# Patient Record
Sex: MALE | Race: WHITE | NOT HISPANIC OR LATINO | URBAN - METROPOLITAN AREA
[De-identification: names, ages, dates, MRNs, and addresses within clinical notes are randomized per-mention and may not be internally consistent; named-entity substitution may affect disease eponyms.]

---

## 2017-05-20 ENCOUNTER — HOSPITAL ENCOUNTER (OUTPATIENT)
Facility: MEDICAL CENTER | Age: 42
End: 2017-05-21
Attending: EMERGENCY MEDICINE | Admitting: INTERNAL MEDICINE

## 2017-05-20 DIAGNOSIS — T50.901A OVERDOSE, ACCIDENTAL OR UNINTENTIONAL, INITIAL ENCOUNTER: ICD-10-CM

## 2017-05-20 LAB
ALBUMIN SERPL BCP-MCNC: 4.3 G/DL (ref 3.2–4.9)
ALBUMIN/GLOB SERPL: 1.3 G/DL
ALP SERPL-CCNC: 114 U/L (ref 30–99)
ALT SERPL-CCNC: 16 U/L (ref 2–50)
AMPHET UR QL SCN: POSITIVE
ANION GAP SERPL CALC-SCNC: 9 MMOL/L (ref 0–11.9)
AST SERPL-CCNC: 25 U/L (ref 12–45)
BARBITURATES UR QL SCN: NEGATIVE
BASOPHILS # BLD AUTO: 0.4 % (ref 0–1.8)
BASOPHILS # BLD: 0.03 K/UL (ref 0–0.12)
BENZODIAZ UR QL SCN: POSITIVE
BILIRUB SERPL-MCNC: 0.5 MG/DL (ref 0.1–1.5)
BUN SERPL-MCNC: 14 MG/DL (ref 8–22)
BZE UR QL SCN: NEGATIVE
CALCIUM SERPL-MCNC: 9.3 MG/DL (ref 8.5–10.5)
CANNABINOIDS UR QL SCN: POSITIVE
CHLORIDE SERPL-SCNC: 100 MMOL/L (ref 96–112)
CO2 SERPL-SCNC: 28 MMOL/L (ref 20–33)
CREAT SERPL-MCNC: 0.96 MG/DL (ref 0.5–1.4)
EOSINOPHIL # BLD AUTO: 0.21 K/UL (ref 0–0.51)
EOSINOPHIL NFR BLD: 2.8 % (ref 0–6.9)
ERYTHROCYTE [DISTWIDTH] IN BLOOD BY AUTOMATED COUNT: 40.1 FL (ref 35.9–50)
GFR SERPL CREATININE-BSD FRML MDRD: >60 ML/MIN/1.73 M 2
GLOBULIN SER CALC-MCNC: 3.4 G/DL (ref 1.9–3.5)
GLUCOSE SERPL-MCNC: 106 MG/DL (ref 65–99)
HCT VFR BLD AUTO: 49.2 % (ref 42–52)
HGB BLD-MCNC: 17 G/DL (ref 14–18)
IMM GRANULOCYTES # BLD AUTO: 0.01 K/UL (ref 0–0.11)
IMM GRANULOCYTES NFR BLD AUTO: 0.1 % (ref 0–0.9)
LYMPHOCYTES # BLD AUTO: 2.1 K/UL (ref 1–4.8)
LYMPHOCYTES NFR BLD: 27.8 % (ref 22–41)
MCH RBC QN AUTO: 31.5 PG (ref 27–33)
MCHC RBC AUTO-ENTMCNC: 34.6 G/DL (ref 33.7–35.3)
MCV RBC AUTO: 91.3 FL (ref 81.4–97.8)
MDMA UR QL SCN: NEGATIVE
METHADONE UR QL SCN: NEGATIVE
MONOCYTES # BLD AUTO: 0.51 K/UL (ref 0–0.85)
MONOCYTES NFR BLD AUTO: 6.7 % (ref 0–13.4)
NEUTROPHILS # BLD AUTO: 4.7 K/UL (ref 1.82–7.42)
NEUTROPHILS NFR BLD: 62.2 % (ref 44–72)
NRBC # BLD AUTO: 0 K/UL
NRBC BLD AUTO-RTO: 0 /100 WBC
OPIATES UR QL SCN: POSITIVE
OXYCODONE UR QL SCN: NEGATIVE
PCP UR QL SCN: NEGATIVE
PLATELET # BLD AUTO: 245 K/UL (ref 164–446)
PMV BLD AUTO: 9.5 FL (ref 9–12.9)
POTASSIUM SERPL-SCNC: 4.2 MMOL/L (ref 3.6–5.5)
PROPOXYPH UR QL SCN: NEGATIVE
PROT SERPL-MCNC: 7.7 G/DL (ref 6–8.2)
RBC # BLD AUTO: 5.39 M/UL (ref 4.7–6.1)
SODIUM SERPL-SCNC: 137 MMOL/L (ref 135–145)
WBC # BLD AUTO: 7.6 K/UL (ref 4.8–10.8)

## 2017-05-20 PROCEDURE — 80053 COMPREHEN METABOLIC PANEL: CPT

## 2017-05-20 PROCEDURE — 85025 COMPLETE CBC W/AUTO DIFF WBC: CPT

## 2017-05-20 PROCEDURE — 94760 N-INVAS EAR/PLS OXIMETRY 1: CPT

## 2017-05-20 PROCEDURE — 700105 HCHG RX REV CODE 258: Performed by: EMERGENCY MEDICINE

## 2017-05-20 PROCEDURE — 96360 HYDRATION IV INFUSION INIT: CPT

## 2017-05-20 PROCEDURE — 99285 EMERGENCY DEPT VISIT HI MDM: CPT

## 2017-05-20 PROCEDURE — 96361 HYDRATE IV INFUSION ADD-ON: CPT

## 2017-05-20 PROCEDURE — 80307 DRUG TEST PRSMV CHEM ANLYZR: CPT

## 2017-05-20 PROCEDURE — 99220 PR INITIAL OBSERVATION CARE,LEVL III: CPT | Performed by: INTERNAL MEDICINE

## 2017-05-20 RX ORDER — SODIUM CHLORIDE 9 MG/ML
1000 INJECTION, SOLUTION INTRAVENOUS ONCE
Status: COMPLETED | OUTPATIENT
Start: 2017-05-20 | End: 2017-05-21

## 2017-05-20 RX ADMIN — SODIUM CHLORIDE 1000 ML: 9 INJECTION, SOLUTION INTRAVENOUS at 22:50

## 2017-05-20 NOTE — IP AVS SNAPSHOT
" <p align=\"LEFT\"><IMG SRC=\"//[email or web address]$/[email or web address]\" alt=\"Image\" WIDTH=\"50%\" HEIGHT=\"200\" BORDER=\"\"></p>                   Name:Geovanny Andrews  Medical Record Number:[id number]  CSN: [id number]    YOB: 1975   Age: 42 y.o.  Sex: male  HT:1.676 m (5' 6\") WT: 77.111 kg (170 lb)          Admit Date: 5/20/2017     Discharge Date:   Today's Date: 5/21/2017  Attending Doctor:  Ricky Brothers M.D.                  Allergies:  Other misc             Medication List      Take these Medications        Instructions    alprazolam 2 MG tablet   Commonly known as:  XANAX    Take 2 mg by mouth 3 times a day as needed for Anxiety.   Dose:  2 mg         "

## 2017-05-20 NOTE — IP AVS SNAPSHOT
" Home Care Instructions                                                                                                                  Name:Geovanny Andrews  Medical Record Number:[id number]  CSN: [id number]    YOB: 1975   Age: 42 y.o.  Sex: male  HT:1.676 m (5' 6\") WT: 77.111 kg (170 lb)          Admit Date: 5/20/2017     Discharge Date:   Today's Date: 5/21/2017  Attending Doctor:  Ricky Brothers M.D.                  Allergies:  Other misc            Discharge Instructions       Discharge Instructions    Discharged to home by car with relative. Discharged via walking, hospital escort: Refused.  Special equipment needed: Not Applicable    Be sure to schedule a follow-up appointment with your primary care doctor or any specialists as instructed.     Discharge Plan:   Smoking Cessation Offered: Patient Refused  Influenza Vaccine Indication: Indicated: Not available from distributor/    I understand that a diet low in cholesterol, fat, and sodium is recommended for good health. Unless I have been given specific instructions below for another diet, I accept this instruction as my diet prescription.   Other diet: Regular    Special Instructions: None    · Is patient discharged on Warfarin / Coumadin?   No     · Is patient Post Blood Transfusion?  No    Depression / Suicide Risk    As you are discharged from this [facility] facility, it is important to learn how to keep safe from harming yourself.    Recognize the warning signs:  · Abrupt changes in personality, positive or negative- including increase in energy   · Giving away possessions  · Change in eating patterns- significant weight changes-  positive or negative  · Change in sleeping patterns- unable to sleep or sleeping all the time   · Unwillingness or inability to communicate  · Depression  · Unusual sadness, discouragement and loneliness  · Talk of wanting to die  · Neglect of personal appearance   · Rebelliousness- reckless " behavior  · Withdrawal from people/activities they love  · Confusion- inability to concentrate     If you or a loved one observes any of these behaviors or has concerns about self-harm, here's what you can do:  · Talk about it- your feelings and reasons for harming yourself  · Remove any means that you might use to hurt yourself (examples: pills, rope, extension cords, firearm)  · Get professional help from the community (Mental Health, Substance Abuse, psychological counseling)  · Do not be alone:Call your Safe Contact- someone whom you trust who will be there for you.  · Call your local CRISIS HOTLINE 552-4837 or 158-813-4617  · Call your local Children's Mobile Crisis Response Team [place] (405) 042-2841 or www.[employer]  · Call the toll free National Suicide Prevention Hotlines   · National Suicide Prevention Lifeline 351-178-PQZN (9593)  · [employer] [facility] Network 800-SUICIDE (762-5209)    Drug Overdose  Drug overdose happens when you take too much of a drug. An overdose can occur with illegal drugs, prescription drugs, or over-the-counter (OTC) drugs.  The effects of drug overdose can be mild, dangerous, or even deadly.  CAUSES  Drug overdose may be caused by:  · Taking too much of a drug on purpose.  · Taking too much of a drug by accident.  · An error made by a health care provider who prescribes a drug.  · An error made by a pharmacist who fills the prescription order.  Drugs that commonly cause overdose include:  · Mental health drugs.  · Pain medicines.  · Illegal drugs.  · OTC cough and cold medicines.  · Heart medicines.  · Seizure medicines.  RISK FACTORS  Drug overdose is more likely in:  · Children. They may be attracted to colorful pills. Because of children's small size, even a small amount of a drug can be dangerous.  · Elderly people. They may be taking many different drugs. Elderly people may have difficulty reading labels or remembering when they last took their medicine.  The  risk of drug overdose is also higher for someone who:  · Takes illegal drugs.  · Takes a drug and drinks alcohol.  · Has a mental health condition.  SYMPTOMS  Signs and symptoms of drug overdose depend on the drug and the amount that was taken. Common danger signs include:  · Behavior changes.  · Sleepiness.  · Slowed breathing.  · Nausea and vomiting.  · Seizures.  · Changes in eye pupil size (very large or very small).  If there are signs of very low blood pressure from a drug overdose (shock), emergency treatment is required. These signs include:  · Cold and clammy skin.  · Pale skin.  · Blue lips.  · Very slow breathing.  · Extreme sleepiness.  · Loss of consciousness.  DIAGNOSIS  Drug overdose may be diagnosed based on your symptoms. It is important that you tell your health care provider:  · All of the drugs that you have taken.  · When you took the drugs.  · Whether you were drinking alcohol.  Your health care provider will do a physical exam. This exam may include:  · Checking and monitoring your heart rate and rhythm, your temperature, and your blood pressure (vital signs).  · Checking your breathing and oxygen level.  You may also have tests, including:   · Urine tests to check for drugs in your system.  · Blood tests to check for:  ¨ Drugs in your system.  ¨ Signs of an imbalance of your blood minerals (electrolytes).  ¨ Liver damage.  ¨ Kidney damage.  TREATMENT  Supporting your vital signs and your breathing is the first step in treating a drug overdose. Treatment may also include:  · Receiving fluids and electrolytes through an IV tube.  · Having a breathing tube (endotracheal tube) inserted in your airway to help you breathe.  · Having a tube passed through your nose and into your stomach (nasogastric tube) to wash out your stomach.  · Medicines. You may get medicines to:  ¨ Make you vomit.  ¨ Absorb any medicine that is left in your digestive system (activated charcoal).  ¨ Block or reverse the  effect of the drug that caused the overdose.  · Having your blood filtered through an artificial kidney machine (hemodialysis). You may need this if your overdose is severe or if you have kidney failure.  · Having ongoing counseling and mental health support if you intentionally overdosed or used an illegal drug.  HOME CARE INSTRUCTIONS  · Take medicines only as directed by your health care provider. Always ask your health care provider to discuss the possible side effects of any new drug that you start taking.  · Keep a list of all of the drugs that you take, including over-the-counter medicines. Bring this list with you to all of your medical visits.  · Read the drug inserts that come with your medicines.  · Do not use illegal drugs.  · Do not drink alcohol when taking drugs.  · Store all medicines in safety containers that are out of the reach of children.  · Keep the phone number of your local poison control center near your phone or on your cell phone.  · Get help if you are struggling with alcohol or drug use.  · Get help if you are struggling with depression or another mental health problem.  · Keep all follow-up visits as directed by your health care provider. This is important.  SEEK MEDICAL CARE IF:  · Your symptoms return.  · You develop any new signs or symptoms when you are taking medicines.  SEEK IMMEDIATE MEDICAL CARE IF:  · You think that you or someone else may have taken too much of a drug. The hotline of the National Poison Control Center is (404) 773-1978.  · You or someone else is having symptoms of a drug overdose.  · You have serious thoughts about hurting yourself or others.  · You have chest pain.  · You have difficulty breathing.  · You have a loss of consciousness.  Drug overdose is an emergency. Do not wait to see if the symptoms will go away. Get medical help right away. Call your local emergency services (911 in the [place] Do not drive yourself to the hospital.     This information is  not intended to replace advice given to you by your health care provider. Make sure you discuss any questions you have with your health care provider.     Document Released: 05/03/2016 Document Reviewed: 12/23/2015  "[employer]" Interactive Patient Education ©2016 "[employer]" Inc.             Discharge Medication Instructions:    Below are the medications your physician expects you to take upon discharge:    Review all your home medications and newly ordered medications with your doctor and/or pharmacist. Follow medication instructions as directed by your doctor and/or pharmacist.    Please keep your medication list with you and share with your physician.               Medication List      CONTINUE taking these medications        Instructions    Morning Afternoon Evening Bedtime    alprazolam 2 MG tablet   Commonly known as:  XANAX        Take 2 mg by mouth 3 times a day as needed for Anxiety.   Dose:  2 mg                                Instructions           Diet / Nutrition:    Follow any diet instructions given to you by your doctor or the dietician, including how much salt (sodium) you are allowed each day.    If you are overweight, talk to your doctor about a weight reduction plan.    Activity:    Remain physically active following your doctor's instructions about exercise and activity.    Rest often.     Any time you become even a little tired or short of breath, SIT DOWN and rest.    Worsening Symptoms:    Report any of the following signs and symptoms to the doctor's office immediately:    *Pain of jaw, arm, or neck  *Chest pain not relieved by medication                               *Dizziness or loss of consciousness  *Difficulty breathing even when at rest   *More tired than usual                                       *Bleeding drainage or swelling of surgical site  *Swelling of feet, ankles, legs or stomach                 *Fever (>100ºF)  *Pink or blood tinged sputum  *Weight gain (3lbs/day or 5lbs /week)            *Shock from internal defibrillator (if applicable)  *Palpitations or irregular heartbeats                *Cool and/or numb extremities    Stroke Awareness    Common Risk Factors for Stroke include:    Age  Atrial Fibrillation  Carotid Artery Stenosis  Diabetes Mellitus  Excessive alcohol consumption  High blood pressure  Overweight   Physical inactivity  Smoking    Warning signs and symptoms of a stroke include:    *Sudden numbness or weakness of the face, arm or leg (especially on one side of the body).  *Sudden confusion, trouble speaking or understanding.  *Sudden trouble seeing in one or both eyes.  *Sudden trouble walking, dizziness, loss of balance or coordination.Sudden severe headache with no known cause.    It is very important to get treatment quickly when a stroke occurs. If you experience any of the above warning signs, call 911 immediately.                   Disclaimer         Quit Smoking / Tobacco Use:    I understand the use of any tobacco products increases my chance of suffering from future heart disease or stroke and could cause other illnesses which may shorten my life. Quitting the use of tobacco products is the single most important thing I can do to improve my health. For further information on smoking / tobacco cessation call a Toll Free Quit Line at 1-123.428.9451 (*National Cancer Epps) or 1-494.713.1508 (American Lung Association) or you can access the web based program at www.lungusa.org.    [place] Tobacco Users Help Line:  (957) 811-5720       Toll Free: 1-152.722.5425  Quit Tobacco Program [facility] (617)501-4250    Crisis Hotline:    [place] Crisis Hotline:  7-423-BZAGXMC or 1-338.611.1349    [place] Crisis Hotline:    1-964.748.2765 or 915-550-0463    Discharge Survey:   Thank you for choosing [facility]. We hope we did everything we could to make your hospital stay a pleasant one. You may be receiving a phone survey and we would appreciate your  time and participation in answering the questions. Your input is very valuable to us in our efforts to improve our service to our patients and their families.        My signature on this form indicates that:    1. I have reviewed and understand the above information.  2. My questions regarding this information have been answered to my satisfaction.  3. I have formulated a plan with my discharge nurse to obtain my prescribed medications for home.                  Disclaimer         __________________________________                     __________       ________                       Patient Signature                                                 Date                    Time

## 2017-05-20 NOTE — IP AVS SNAPSHOT
Echoing Green Access Code: [email or web address]  Expires: 6/20/2017  1:14 PM    Your email address is not on file at [email or web address].  Email Addresses are required for you to sign up for Echoing Green, please contact [phone] to verify your personal information and to provide your email address prior to attempting to register for Echoing Green.    Geovanny Andrews  [address] [facility], [place] [address]    Echoing Green  A secure, online tool to manage your health information     [email or web address]’s Echoing Green® is a secure, online tool that connects you to your personalized health information from the privacy of your home -- day or night - making it very easy for you to manage your healthcare. Once the activation process is completed, you can even access your medical information using the Echoing Green arnoldo, which is available for free in the Apple Arnoldo store or Google Play store.     To learn more about Echoing Green, visit [email or web address]    There are two levels of access available (as shown below):   My Chart Features  [facility] Primary Care Doctor [facility]  Specialists [facility]  Urgent  Care Non-[facility] Primary Care Doctor   Email your healthcare team securely and privately 24/7 X X X    Manage appointments: schedule your next appointment; view details of past/upcoming appointments X      Request prescription refills. X      View recent personal medical records, including lab and immunizations X X X X   View health record, including health history, allergies, medications X X X X   Read reports about your outpatient visits, procedures, consult and ER notes X X X X   See your discharge summary, which is a recap of your hospital and/or ER visit that includes your diagnosis, lab results, and care plan X X  X     How to register for Echoing Green:  Once your e-mail address has been verified, follow the following steps to sign up for Echoing Green.     1. Go to  [email or web address]  2. Click on the Sign Up Now box, which takes you to the New Member Sign Up page.  You will need to provide the following information:  a. Enter your [employer] Access Code exactly as it appears at the top of this page. (You will not need to use this code after you’ve completed the sign-up process. If you do not sign up before the expiration date, you must request a new code.)   b. Enter your date of birth.   c. Enter your home email address.   d. Click Submit, and follow the next screen’s instructions.  3. Create a Airside Mobilet ID. This will be your [employer] login ID and cannot be changed, so think of one that is secure and easy to remember.  4. Create a [employer] password. You can change your password at any time.  5. Enter your Password Reset Question and Answer. This can be used at a later time if you forget your password.   6. Enter your e-mail address. This allows you to receive e-mail notifications when new information is available in [employer].  7. Click Sign Up. You can now view your health information.    For assistance activating your [employer] account, call [phone]

## 2017-05-20 NOTE — IP AVS SNAPSHOT
5/21/2017    Geovanny Andrews  [address] [facility] [address]    Dear Geovanny:    [facility] wants to ensure your discharge home is safe and you or your loved ones have had all of your questions answered regarding your care after you leave the hospital.    Below is a list of resources and contact information should you have any questions regarding your hospital stay, follow-up instructions, or active medical symptoms.    Questions or Concerns Regarding… Contact   Medical Questions Related to Your Discharge  (7 days a week, 8am-5pm) Contact a Nurse Care Coordinator   [phone]   Medical Questions Not Related to Your Discharge  (24 hours a day / 7 days a week)  Contact the Nurse Health Line   [phone]    Medications or Discharge Instructions Refer to your discharge packet   or contact your [facility] Primary Care Provider   [phone]   Follow-up Appointment(s) Schedule your appointment via [employer]   or contact Scheduling [phone]   Billing Review your statement via [employer]  or contact Billing [phone]   Medical Records Review your records via [employer]   or contact Medical Records [phone]     You may receive a telephone call within two days of discharge. This call is to make certain you understand your discharge instructions and have the opportunity to have any questions answered. You can also easily access your medical information, test results and upcoming appointments via the [employer] free online health management tool. You can learn more and sign up at [employer]. For assistance setting up your [employer] account, please call [phone].    Once again, we want to ensure your discharge home is safe and that you have a clear understanding of any next steps in your care. If you have any questions or concerns, please do not hesitate to contact us, we are here for you. Thank you for choosing [facility] for your healthcare needs.    Sincerely,    Your [facility] Healthcare Team

## 2017-05-21 ENCOUNTER — RESOLUTE PROFESSIONAL BILLING HOSPITAL PROF FEE (OUTPATIENT)
Dept: HOSPITALIST | Facility: MEDICAL CENTER | Age: 42
End: 2017-05-21

## 2017-05-21 VITALS
WEIGHT: 170 LBS | BODY MASS INDEX: 27.32 KG/M2 | HEIGHT: 66 IN | HEART RATE: 74 BPM | SYSTOLIC BLOOD PRESSURE: 96 MMHG | DIASTOLIC BLOOD PRESSURE: 53 MMHG | TEMPERATURE: 97.2 F | RESPIRATION RATE: 18 BRPM | OXYGEN SATURATION: 98 %

## 2017-05-21 PROBLEM — I10 HYPERTENSION: Status: ACTIVE | Noted: 2017-05-21

## 2017-05-21 PROBLEM — F19.20 POLYSUBSTANCE DEPENDENCE INCLUDING OPIOID TYPE DRUG WITH COMPLICATION, EPISODIC ABUSE (HCC): Status: ACTIVE | Noted: 2017-05-21

## 2017-05-21 PROBLEM — T50.901A DRUG OVERDOSE: Status: ACTIVE | Noted: 2017-05-21

## 2017-05-21 PROCEDURE — 94640 AIRWAY INHALATION TREATMENT: CPT

## 2017-05-21 PROCEDURE — 99217 PR OBSERVATION CARE DISCHARGE: CPT | Performed by: HOSPITALIST

## 2017-05-21 PROCEDURE — 700101 HCHG RX REV CODE 250: Performed by: EMERGENCY MEDICINE

## 2017-05-21 PROCEDURE — 700105 HCHG RX REV CODE 258: Performed by: INTERNAL MEDICINE

## 2017-05-21 PROCEDURE — 700105 HCHG RX REV CODE 258: Performed by: HOSPITALIST

## 2017-05-21 PROCEDURE — G0378 HOSPITAL OBSERVATION PER HR: HCPCS

## 2017-05-21 RX ORDER — IPRATROPIUM BROMIDE AND ALBUTEROL SULFATE 2.5; .5 MG/3ML; MG/3ML
3 SOLUTION RESPIRATORY (INHALATION)
Status: DISCONTINUED | OUTPATIENT
Start: 2017-05-21 | End: 2017-05-21 | Stop reason: HOSPADM

## 2017-05-21 RX ORDER — PROMETHAZINE HYDROCHLORIDE 25 MG/1
12.5-25 SUPPOSITORY RECTAL EVERY 4 HOURS PRN
Status: DISCONTINUED | OUTPATIENT
Start: 2017-05-21 | End: 2017-05-21 | Stop reason: HOSPADM

## 2017-05-21 RX ORDER — AMOXICILLIN 250 MG
2 CAPSULE ORAL 2 TIMES DAILY
Status: DISCONTINUED | OUTPATIENT
Start: 2017-05-21 | End: 2017-05-21 | Stop reason: HOSPADM

## 2017-05-21 RX ORDER — ONDANSETRON 2 MG/ML
4 INJECTION INTRAMUSCULAR; INTRAVENOUS EVERY 4 HOURS PRN
Status: DISCONTINUED | OUTPATIENT
Start: 2017-05-21 | End: 2017-05-21 | Stop reason: HOSPADM

## 2017-05-21 RX ORDER — ONDANSETRON 4 MG/1
4 TABLET, ORALLY DISINTEGRATING ORAL EVERY 4 HOURS PRN
Status: DISCONTINUED | OUTPATIENT
Start: 2017-05-21 | End: 2017-05-21 | Stop reason: HOSPADM

## 2017-05-21 RX ORDER — BISACODYL 10 MG
10 SUPPOSITORY, RECTAL RECTAL
Status: DISCONTINUED | OUTPATIENT
Start: 2017-05-21 | End: 2017-05-21 | Stop reason: HOSPADM

## 2017-05-21 RX ORDER — SODIUM CHLORIDE 9 MG/ML
1000 INJECTION, SOLUTION INTRAVENOUS ONCE
Status: COMPLETED | OUTPATIENT
Start: 2017-05-21 | End: 2017-05-21

## 2017-05-21 RX ORDER — POLYETHYLENE GLYCOL 3350 17 G/17G
1 POWDER, FOR SOLUTION ORAL
Status: DISCONTINUED | OUTPATIENT
Start: 2017-05-21 | End: 2017-05-21 | Stop reason: HOSPADM

## 2017-05-21 RX ORDER — ACETAMINOPHEN 325 MG/1
650 TABLET ORAL EVERY 6 HOURS PRN
Status: DISCONTINUED | OUTPATIENT
Start: 2017-05-21 | End: 2017-05-21 | Stop reason: HOSPADM

## 2017-05-21 RX ORDER — PROMETHAZINE HYDROCHLORIDE 25 MG/1
12.5-25 TABLET ORAL EVERY 4 HOURS PRN
Status: DISCONTINUED | OUTPATIENT
Start: 2017-05-21 | End: 2017-05-21 | Stop reason: HOSPADM

## 2017-05-21 RX ORDER — ALPRAZOLAM 2 MG/1
2 TABLET ORAL 3 TIMES DAILY PRN
COMMUNITY

## 2017-05-21 RX ORDER — SODIUM CHLORIDE 9 MG/ML
INJECTION, SOLUTION INTRAVENOUS CONTINUOUS
Status: DISCONTINUED | OUTPATIENT
Start: 2017-05-21 | End: 2017-05-21 | Stop reason: HOSPADM

## 2017-05-21 RX ADMIN — SODIUM CHLORIDE 1000 ML: 9 INJECTION, SOLUTION INTRAVENOUS at 08:08

## 2017-05-21 RX ADMIN — IPRATROPIUM BROMIDE AND ALBUTEROL SULFATE 3 ML: .5; 3 SOLUTION RESPIRATORY (INHALATION) at 02:55

## 2017-05-21 RX ADMIN — IPRATROPIUM BROMIDE AND ALBUTEROL SULFATE 3 ML: .5; 3 SOLUTION RESPIRATORY (INHALATION) at 11:30

## 2017-05-21 RX ADMIN — SODIUM CHLORIDE: 9 INJECTION, SOLUTION INTRAVENOUS at 04:30

## 2017-05-21 ASSESSMENT — LIFESTYLE VARIABLES
ALCOHOL_USE: NO
DO YOU DRINK ALCOHOL: NO
EVER_SMOKED: YES
DO YOU DRINK ALCOHOL: NO
EVER_SMOKED: YES

## 2017-05-21 ASSESSMENT — PAIN SCALES - GENERAL
PAINLEVEL_OUTOF10: 0

## 2017-05-21 ASSESSMENT — COPD QUESTIONNAIRES
DURING THE PAST 4 WEEKS HOW MUCH DID YOU FEEL SHORT OF BREATH: NONE/LITTLE OF THE TIME
HAVE YOU SMOKED AT LEAST 100 CIGARETTES IN YOUR ENTIRE LIFE: YES
DO YOU EVER COUGH UP ANY MUCUS OR PHLEGM?: NO/ONLY WITH OCCASIONAL COLDS OR INFECTIONS
COPD SCREENING SCORE: 2

## 2017-05-21 NOTE — ED NOTES
RN ASSIST: Pt's wife Yamileth on phone. Pt gave verbal permission to speak with wife. POC given to wife and all questions answered. Demographics updated.

## 2017-05-21 NOTE — PROGRESS NOTES
PT discharged home with relative via personal vehicle. PIV DC'ed. Prescriptions given. Discharge instructions given specifically involving diagnosis, patient verbalized understanding, 2nd copy of AVS signed and placed in patient chart. .Pt left unit ambulatory. Refused transport services.

## 2017-05-21 NOTE — ED NOTES
"To ED per EMS. Friend of patient called 911 after the patient became uresponsive and stopped breathing. On arrival EMS assisted with ventilations and was given 1mg IM Narcan. Patient states he took \"5 points\" of heroin and 2 Xanax bars today. States that he normally smokes heroin but shot up IV today.   "

## 2017-05-21 NOTE — DISCHARGE SUMMARY
Hospital Medicine Discharge Note     Admit Date:  5/20/2017       Discharge Date:   5/21/2017    Attending Physician:  Ricky Brothers     Diagnoses (includes active and resolved):   Active Problems:    Drug overdose POA: Unknown    Hypertension POA: Unknown    Polysubstance dependence including opioid type drug with complication, episodic abuse (CMS-HCC) POA: Unknown  Resolved Problems:    * No resolved hospital problems. *      Hospital Summary (Brief Narrative):       42-year-old male w/h/o polysubstance drug abuse including heroin presented after being found unresponsive by a friend.  He denies any suicidal attempt.  Patient states that he had made a mistake and had taken too much heroin as well as his usual Xanax dosing.   He was admitted for drug overdose. Conservative management was used. With observation, he slowly improved. He was hypoxic and hypotensive but this improved as well. He was able to be discharged home after strict counseling about quitting any and all IV drugs.     Consultants:      None    Procedures:        None    Discharge Medications:           Medication List      CONTINUE taking these medications       Instructions    alprazolam 2 MG tablet   Commonly known as:  XANAX    Take 2 mg by mouth 3 times a day as needed for Anxiety.   Dose:  2 mg           Disposition:   Discharge home    Diet:   Regular    Activity:   As tolerated    Code status:   Full code    Primary Care Provider:    Pcp Pt States None    Follow up appointment details :      PCP in 2 weeks  No follow-up provider specified.  No future appointments.    Pending Studies:        None    #################################################    Interval history/exam for day of discharge:    Filed Vitals:    05/21/17 0319 05/21/17 0420 05/21/17 0936 05/21/17 1130   BP:  104/63 96/53    Pulse: 55 53 88 74   Temp:  36.3 °C (97.3 °F) 36.2 °C (97.2 °F)    Resp:  18 18 18   Height:       Weight:       SpO2: 89% 93% 95% 98%     Weight/BMI: Body  mass index is 27.45 kg/(m^2).  Pulse Oximetry: 98 %, O2 (LPM): 0.5, O2 Delivery: None (Room Air)    General:  NAD  CVS:  RRR  PULM:  CTAB, no respiratory distress    Most Recent Labs:    Lab Results   Component Value Date/Time    WBC 7.6 05/20/2017 10:40 PM    RBC 5.39 05/20/2017 10:40 PM    HEMOGLOBIN 17.0 05/20/2017 10:40 PM    HEMATOCRIT 49.2 05/20/2017 10:40 PM    MCV 91.3 05/20/2017 10:40 PM    MCH 31.5 05/20/2017 10:40 PM    MCHC 34.6 05/20/2017 10:40 PM    MPV 9.5 05/20/2017 10:40 PM    NEUTROPHILS-POLYS 62.20 05/20/2017 10:40 PM    LYMPHOCYTES 27.80 05/20/2017 10:40 PM    MONOCYTES 6.70 05/20/2017 10:40 PM    EOSINOPHILS 2.80 05/20/2017 10:40 PM    BASOPHILS 0.40 05/20/2017 10:40 PM      Lab Results   Component Value Date/Time    SODIUM 137 05/20/2017 10:40 PM    POTASSIUM 4.2 05/20/2017 10:40 PM    CHLORIDE 100 05/20/2017 10:40 PM    CO2 28 05/20/2017 10:40 PM    GLUCOSE 106* 05/20/2017 10:40 PM    BUN 14 05/20/2017 10:40 PM    CREATININE 0.96 05/20/2017 10:40 PM      Lab Results   Component Value Date/Time    ALT(SGPT) 16 05/20/2017 10:40 PM    AST(SGOT) 25 05/20/2017 10:40 PM    ALKALINE PHOSPHATASE 114* 05/20/2017 10:40 PM    TOTAL BILIRUBIN 0.5 05/20/2017 10:40 PM    ALBUMIN 4.3 05/20/2017 10:40 PM    GLOBULIN 3.4 05/20/2017 10:40 PM     No results found for: PROTHROMBTM, INR     Imaging/ Testing:      No orders to display       Instructions:      The patient was instructed to return to the ER in the event of worsening symptoms. I have counseled the patient on the importance of compliance and the patient has agreed to proceed with all medical recommendations and follow up plan indicated above.   The patient understands that all medications come with benefits and risks. Risks may include permanent injury or death and these risks can be minimized with close reassessment and monitoring.

## 2017-05-21 NOTE — DISCHARGE INSTRUCTIONS
Discharge Instructions    Discharged to home by car with relative. Discharged via walking, hospital escort: Refused.  Special equipment needed: Not Applicable    Be sure to schedule a follow-up appointment with your primary care doctor or any specialists as instructed.     Discharge Plan:   Smoking Cessation Offered: Patient Refused  Influenza Vaccine Indication: Indicated: Not available from distributor/    I understand that a diet low in cholesterol, fat, and sodium is recommended for good health. Unless I have been given specific instructions below for another diet, I accept this instruction as my diet prescription.   Other diet: Regular    Special Instructions: None    · Is patient discharged on Warfarin / Coumadin?   No     · Is patient Post Blood Transfusion?  No    Depression / Suicide Risk    As you are discharged from this [facility] facility, it is important to learn how to keep safe from harming yourself.    Recognize the warning signs:  · Abrupt changes in personality, positive or negative- including increase in energy   · Giving away possessions  · Change in eating patterns- significant weight changes-  positive or negative  · Change in sleeping patterns- unable to sleep or sleeping all the time   · Unwillingness or inability to communicate  · Depression  · Unusual sadness, discouragement and loneliness  · Talk of wanting to die  · Neglect of personal appearance   · Rebelliousness- reckless behavior  · Withdrawal from people/activities they love  · Confusion- inability to concentrate     If you or a loved one observes any of these behaviors or has concerns about self-harm, here's what you can do:  · Talk about it- your feelings and reasons for harming yourself  · Remove any means that you might use to hurt yourself (examples: pills, rope, extension cords, firearm)  · Get professional help from the community (Mental Health, Substance Abuse, psychological counseling)  · Do not be alone:Call  your Safe Contact- someone whom you trust who will be there for you.  · Call your local CRISIS HOTLINE 640-9665 or 061-650-6874  · Call your local Children's Mobile Crisis Response Team [place] (154) 119-5641 or www.RecoVend  · Call the toll free National Suicide Prevention Hotlines   · National Suicide Prevention Lifeline 882-966-WCDC (7644)  · National Hope Line Network 800-SUICIDE (499-2392)    Drug Overdose  Drug overdose happens when you take too much of a drug. An overdose can occur with illegal drugs, prescription drugs, or over-the-counter (OTC) drugs.  The effects of drug overdose can be mild, dangerous, or even deadly.  CAUSES  Drug overdose may be caused by:  · Taking too much of a drug on purpose.  · Taking too much of a drug by accident.  · An error made by a health care provider who prescribes a drug.  · An error made by a pharmacist who fills the prescription order.  Drugs that commonly cause overdose include:  · Mental health drugs.  · Pain medicines.  · Illegal drugs.  · OTC cough and cold medicines.  · Heart medicines.  · Seizure medicines.  RISK FACTORS  Drug overdose is more likely in:  · Children. They may be attracted to colorful pills. Because of children's small size, even a small amount of a drug can be dangerous.  · Elderly people. They may be taking many different drugs. Elderly people may have difficulty reading labels or remembering when they last took their medicine.  The risk of drug overdose is also higher for someone who:  · Takes illegal drugs.  · Takes a drug and drinks alcohol.  · Has a mental health condition.  SYMPTOMS  Signs and symptoms of drug overdose depend on the drug and the amount that was taken. Common danger signs include:  · Behavior changes.  · Sleepiness.  · Slowed breathing.  · Nausea and vomiting.  · Seizures.  · Changes in eye pupil size (very large or very small).  If there are signs of very low blood pressure from a drug overdose (shock),  emergency treatment is required. These signs include:  · Cold and clammy skin.  · Pale skin.  · Blue lips.  · Very slow breathing.  · Extreme sleepiness.  · Loss of consciousness.  DIAGNOSIS  Drug overdose may be diagnosed based on your symptoms. It is important that you tell your health care provider:  · All of the drugs that you have taken.  · When you took the drugs.  · Whether you were drinking alcohol.  Your health care provider will do a physical exam. This exam may include:  · Checking and monitoring your heart rate and rhythm, your temperature, and your blood pressure (vital signs).  · Checking your breathing and oxygen level.  You may also have tests, including:   · Urine tests to check for drugs in your system.  · Blood tests to check for:  ¨ Drugs in your system.  ¨ Signs of an imbalance of your blood minerals (electrolytes).  ¨ Liver damage.  ¨ Kidney damage.  TREATMENT  Supporting your vital signs and your breathing is the first step in treating a drug overdose. Treatment may also include:  · Receiving fluids and electrolytes through an IV tube.  · Having a breathing tube (endotracheal tube) inserted in your airway to help you breathe.  · Having a tube passed through your nose and into your stomach (nasogastric tube) to wash out your stomach.  · Medicines. You may get medicines to:  ¨ Make you vomit.  ¨ Absorb any medicine that is left in your digestive system (activated charcoal).  ¨ Block or reverse the effect of the drug that caused the overdose.  · Having your blood filtered through an artificial kidney machine (hemodialysis). You may need this if your overdose is severe or if you have kidney failure.  · Having ongoing counseling and mental health support if you intentionally overdosed or used an illegal drug.  HOME CARE INSTRUCTIONS  · Take medicines only as directed by your health care provider. Always ask your health care provider to discuss the possible side effects of any new drug that you  start taking.  · Keep a list of all of the drugs that you take, including over-the-counter medicines. Bring this list with you to all of your medical visits.  · Read the drug inserts that come with your medicines.  · Do not use illegal drugs.  · Do not drink alcohol when taking drugs.  · Store all medicines in safety containers that are out of the reach of children.  · Keep the phone number of your local poison control center near your phone or on your cell phone.  · Get help if you are struggling with alcohol or drug use.  · Get help if you are struggling with depression or another mental health problem.  · Keep all follow-up visits as directed by your health care provider. This is important.  SEEK MEDICAL CARE IF:  · Your symptoms return.  · You develop any new signs or symptoms when you are taking medicines.  SEEK IMMEDIATE MEDICAL CARE IF:  · You think that you or someone else may have taken too much of a drug. The hotline of the [place] [facility] is (660) 097-1077.  · You or someone else is having symptoms of a drug overdose.  · You have serious thoughts about hurting yourself or others.  · You have chest pain.  · You have difficulty breathing.  · You have a loss of consciousness.  Drug overdose is an emergency. Do not wait to see if the symptoms will go away. Get medical help right away. Call your local emergency services (911 in the [place] Do not drive yourself to the hospital.     This information is not intended to replace advice given to you by your health care provider. Make sure you discuss any questions you have with your health care provider.     Document Released: 05/03/2016 Document Reviewed: 12/23/2015  Elsevier Interactive Patient Education ©2016 [employer]

## 2017-05-21 NOTE — PROGRESS NOTES
Med rec complete per patient  Allergies reviewed    Per patient Xanax is NOT prescribed by a doctor but he takes it for Anxiety and his BP

## 2017-05-21 NOTE — PROGRESS NOTES
Assumed patient care at 0700. Received report from night shift. Assessment completed. Pt A&Ox 3, disoriented to time, very fatigued with slurred speech. IV fluids running NS at 75ml/hr.  in use. Fall precautions in place; bed alarm on, treaded socks on, personal possessions and call light placed within reach. Pt denies any additional needs at this time.

## 2017-05-21 NOTE — H&P
DATE OF ADMISSION:  05/20/2017      PRIMARY CARE PHYSICIAN:  None.      CHIEF COMPLAINT ON ADMISSION:  Accidental polysubstance drug overdose   including heroin and Xanax.      HISTORY OF PRESENT ILLNESS:  Patient is a pleasant 42-year-old male with known   history of polysubstance drug abuse including heroin.  Patient was brought in   by after he was found unresponsive by a friend.  This information is obtained   from emergency room records as patient is sedated.  Patient was given Narcan   in the emergency room with improvement arousal.  Patient admits to heroin use   this evening as well as Xanax.      On my evaluation, patient is arousable.  Patient is oriented x2-3.  Patient   denies any pain.  He denies any suicidal attempt.  Patient states that he had   made a mistake and had taken too much heroin as well as his usual Xanax   dosing.  Patient is moving all extremities spontaneously, has no complaints of   headache, dizziness, nausea or vomiting.      REVIEW OF SYSTEMS:  As per HPI.  All other systems reviewed and negative.      PAST MEDICAL HISTORY:  Hypertension, dyslipidemia, medical noncompliance,   polysubstance drug abuse.      SOCIAL HISTORY:  Patient reports heroin use as well as Xanax use.  Denies any   tobacco or alcohol use.      CODE STATUS:  Full code.  Functional with ADLs and IADLs.      FAMILY HISTORY:  Reviewed, is noncontributory to this presentation.      HOME MEDICATIONS:  Patient reports taking Xanax regularly.  Patient was to be   on a cholesterol medication; however, is noncompliant.      ALLERGIES:  No known drug allergies.      PAST SURGICAL HISTORY:  None.      PHYSICAL EXAMINATION:    VITAL SIGNS:  On admission, temperature is 36.5, pulse 59, respirations 16,   satting 94% on 2 liters nasal cannula and blood pressure 95/69.    GENERAL:  Patient is arousable, oriented x2 to 3, in no acute distress.    HEENT:  Normocephalic, atraumatic.  Mucous membranes are moist.  Pupils are    pinpoint.    NECK:  No JVD.  No thyromegaly.    CARDIOVASCULAR:  S1, S2 is regular.  No murmurs noted.    RESPIRATORY:  Lungs are clear to auscultation bilaterally.  No crackles,   wheezes or rales.    CHEST WALL:  Nontender to palpation.  No masses noted.    ABDOMEN:  Bowel sounds are positive, soft, nontender, nondistended.    EXTREMITIES:  No lower extremity edema.  Distal pulses palpable bilaterally.    No calf tenderness to palpation.    SKIN:  No ecchymosis or purpura.    PSYCHIATRIC:  Does not appear anxious or depressed.    NEUROLOGIC:  Cranial nerves grossly intact.  Moving all extremities   spontaneously.  Muscle strength was 5/5 bilateral upper extremity and lower   extremity.      LABORATORY DATA:  On admission, white count of 7.6, hemoglobin _____ platelet   count 245.  Sodium 137, potassium is 4.0, anion gap of 9, glucose 106, BUN 14   and creatinine 0.96.  LFTs are within normal limits.  Alkaline phosphatase is   114.  Urine drug screen is positive for benzodiazepines, opiates, cannabinoid   and amphetamines.      ASSESSMENT AND PLAN:   Patient is a 42-year-old male with polysubstance drug   overdose including heroin and Xanax with sedation.    1.  Accidental polysubstance drug overdose.  Patient will be admitted to the   medical observation unit.  We will continue to monitor for arousal.  Patient   has been given Narcan in the emergency room.  At this point, patient appears   stable.  He is maintaining his airway.  Patient denies suicidal attempt.    2.  History of hypertension, now normotensive.  We will continue to monitor.    3.  History of dyslipidemia.  We will check a lipid profile.    4.  Medical noncompliance.    5.  Gastrointestinal and deep venous thrombosis prophylaxis.  Patient is   tolerating oral diet and will be placed on sequential compression devices.    6.  Code status:  Full code.      Patient will be admitted to the medical observation unit.  We anticipate less   than 2 midnight  stay.      Total admission time is 45 minutes.       ____________________________________     DO KASANDRA HARPER / KELLIE    DD:  05/21/2017 03:07:57  DT:  05/21/2017 07:00:35    D#:  [id number]  Job#:  [id number]

## 2024-04-13 ENCOUNTER — HOSPITAL ENCOUNTER (EMERGENCY)
Facility: MEDICAL CENTER | Age: 49
End: 2024-04-13
Attending: EMERGENCY MEDICINE
Payer: COMMERCIAL

## 2024-04-13 VITALS
SYSTOLIC BLOOD PRESSURE: 135 MMHG | TEMPERATURE: 98.1 F | DIASTOLIC BLOOD PRESSURE: 89 MMHG | OXYGEN SATURATION: 97 % | HEART RATE: 86 BPM | WEIGHT: 199.08 LBS | BODY MASS INDEX: 32.13 KG/M2 | RESPIRATION RATE: 17 BRPM

## 2024-04-13 DIAGNOSIS — K04.7 DENTAL INFECTION: ICD-10-CM

## 2024-04-13 PROCEDURE — 99283 EMERGENCY DEPT VISIT LOW MDM: CPT

## 2024-04-13 PROCEDURE — A9270 NON-COVERED ITEM OR SERVICE: HCPCS | Mod: UD | Performed by: EMERGENCY MEDICINE

## 2024-04-13 PROCEDURE — 700102 HCHG RX REV CODE 250 W/ 637 OVERRIDE(OP): Mod: UD | Performed by: EMERGENCY MEDICINE

## 2024-04-13 RX ORDER — AMOXICILLIN AND CLAVULANATE POTASSIUM 875; 125 MG/1; MG/1
1 TABLET, FILM COATED ORAL ONCE
Status: COMPLETED | OUTPATIENT
Start: 2024-04-13 | End: 2024-04-13

## 2024-04-13 RX ORDER — AMOXICILLIN AND CLAVULANATE POTASSIUM 875; 125 MG/1; MG/1
1 TABLET, FILM COATED ORAL 2 TIMES DAILY
Qty: 14 TABLET | Refills: 0 | Status: ACTIVE | OUTPATIENT
Start: 2024-04-13 | End: 2024-04-20

## 2024-04-13 RX ORDER — IBUPROFEN 600 MG/1
600 TABLET ORAL EVERY 6 HOURS PRN
Qty: 30 TABLET | Refills: 0 | Status: SHIPPED | OUTPATIENT
Start: 2024-04-13

## 2024-04-13 RX ADMIN — AMOXICILLIN AND CLAVULANATE POTASSIUM 1 TABLET: 875; 125 TABLET, FILM COATED ORAL at 19:10

## 2024-04-13 ASSESSMENT — PAIN DESCRIPTION - PAIN TYPE: TYPE: ACUTE PAIN

## 2024-04-14 NOTE — ED PROVIDER NOTES
ER Provider Note    Scribed for Danny Sumner M.d. by Darlene Velasco. 4/13/2024  6:51 PM    Primary Care Provider: Pcp Pt States None    CHIEF COMPLAINT   Chief Complaint   Patient presents with    Facial Swelling     To L side of face, pt reports he has been self-medicating with 500 mg amoxicillin x 1 day.       EXTERNAL RECORDS REVIEWED  Inpatient Notes admitted for drug overdose in 2017    HPI/ROS  LIMITATION TO HISTORY   Select: : None  OUTSIDE HISTORIAN(S):  None    Geovanny Andrews is a 49 y.o. male who presents to the ED complaining of left-sided facial swelling onset yesterday. He denies fever. He reports multiple bad teeth on the left side of his face. Patient took 5 total tablets of 500 mg amoxicillin. He denies having a dentist and would like information on available opportunities to see a dentist in [place].    PAST MEDICAL HISTORY  Past Medical History:   Diagnosis Date    Asthma     Hypertension      SURGICAL HISTORY  History reviewed. No pertinent surgical history.    FAMILY HISTORY  History reviewed. No pertinent family history.    SOCIAL HISTORY   reports that he has been smoking cigarettes. He does not have any smokeless tobacco history on file. He reports that he does not currently use alcohol. He reports that he does not currently use drugs.    CURRENT MEDICATIONS  Previous Medications    ALPRAZOLAM (XANAX) 2 MG TABLET    Take 2 mg by mouth 3 times a day as needed for Anxiety.     ALLERGIES  Other misc    PHYSICAL EXAM  BP (!) 142/90   Pulse 87   Temp 36.6 °C (97.8 °F)   Resp 16   Wt 90.3 kg (199 lb 1.2 oz)   SpO2 95%   BMI 32.13 kg/m²     Pulse ox interpretation: I interpret this pulse ox as normal.  Constitutional: Alert in no apparent distress.  HENT: Normocephalic, Atraumatic, Bilateral external ears normal. Nose normal. Multiple dental cavities down to gumline on left upper mouth. No obvious abscess, no swelling to left upper mouth. No pharyngeal swelling. No Tad's  angina.  Eyes: Pupils are equal and reactive. Conjunctiva normal, non-icteric.   Heart: Regular rate and rhythm, no murmurs.    Lungs: Clear to auscultation bilaterally.  Skin: Warm, Dry, No erythema, No rash.   Neurologic: Alert, Grossly non-focal.   Psychiatric: Affect normal, Judgment normal, Mood normal, Appears appropriate and not intoxicated.     COURSE & MEDICAL DECISION MAKING     ASSESSMENT, COURSE AND PLAN  Care Narrative:     6:58 PM - Patient seen and examined at bedside for facial swelling. He will be medicated with Augmentin 875-125 mg 1 tablet. Patient's condition does not require further care in the ED and can be treated with continued antibiotics and pain medication at home. I have prescribed Augmentin 875-125 mg tablet and Motrin 600 mg tablet for the patient to use at home as directed. The patient is stable for discharge at this time and will return for any new or worsening symptoms. Patient verbalizes understanding and support with my plan for discharge.      PROBLEM LIST  Patient presents with multiple dental caries down to the gumline.  There is no obvious dental abscess.  I suspect the slight swelling of his cheek is secondary to inflammation and not a abscess.  At this point time there is nothing for me to drain.  I think we can start the patient on Augmentin.  Patient does not show any signs of respiratory compromise or Yary's angina.  Discussed with the patient that he needs to follow-up with a dentist to have his multiple dental caries addressed as this is just a temporizing measure.    DISPOSITION AND DISCUSSIONS  I have discussed management of the patient with the following physicians and МАРИЯ's:  None    Discussion of management with other QHP or appropriate source(s): None     Escalation of care considered, and ultimately not performed: diagnostic imaging.  Considered CT of the face but I do not think it is warranted as I do not think there is an abscess.  Barriers to care at this  time, including but not limited to: Patient does not have established PCP.     Decision tools and prescription drugs considered including, but not limited to: Antibiotics Augmentin .    The patient will return for new or worsening symptoms and is stable at the time of discharge.    The patient is referred to a primary physician for blood pressure management, diabetic screening, and for all other preventative health concerns.    DISPOSITION:  Patient will be discharged home in stable condition.    FOLLOW UP:  Dentist    Schedule an appointment as soon as possible for a visit       [address] [id number] [phone]    If you need a doctor or dentist    OUTPATIENT MEDICATIONS:  New Prescriptions    AMOXICILLIN-CLAVULANATE (AUGMENTIN) 875-125 MG TAB    Take 1 Tablet by mouth 2 times a day for 7 days.    IBUPROFEN (MOTRIN) 600 MG TAB    Take 1 Tablet by mouth every 6 hours as needed for Moderate Pain or Fever.     FINAL DIAGNOSIS  1. Dental infection      Darlene PERRY (Julia), am scribing for, and in the presence of, Danny Sumner M.D..    Electronically signed by: Darlene Velasco (Julia), 4/13/2024    Danny PERRY M.D., personally performed the services described in this documentation, as scribed by Darlene Velasco in my presence, and it is both accurate and complete.    The note accurately reflects work and decisions made by me.  Danny Sumner M.D.  4/13/2024  9:50 PM

## 2024-04-14 NOTE — DISCHARGE INSTRUCTIONS
Take all your antibiotics until gone.  Return to the emergency department if you have increasing facial swelling, difficulty swallowing, difficulty breathing or fever that will not go down with Tylenol and ibuprofen.

## 2024-04-14 NOTE — ED TRIAGE NOTES
Chief Complaint   Patient presents with    Facial Swelling     To L side of face, pt reports he has been self-medicating with 500 mg amoxicillin x 1 day.       '